# Patient Record
Sex: MALE | Race: WHITE | NOT HISPANIC OR LATINO | Employment: STUDENT
[De-identification: names, ages, dates, MRNs, and addresses within clinical notes are randomized per-mention and may not be internally consistent; named-entity substitution may affect disease eponyms.]

---
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
## 2025-03-09 ENCOUNTER — HEALTH MAINTENANCE LETTER (OUTPATIENT)
Age: 20
End: 2025-03-09

## 2025-04-05 ENCOUNTER — HEALTH MAINTENANCE LETTER (OUTPATIENT)
Age: 20
End: 2025-04-05

## 2025-06-01 ENCOUNTER — APPOINTMENT (OUTPATIENT)
Dept: GENERAL RADIOLOGY | Facility: CLINIC | Age: 20
End: 2025-06-01
Attending: NURSE PRACTITIONER
Payer: COMMERCIAL

## 2025-06-01 ENCOUNTER — HOSPITAL ENCOUNTER (EMERGENCY)
Facility: CLINIC | Age: 20
Discharge: HOME | End: 2025-06-01
Attending: NURSE PRACTITIONER | Admitting: NURSE PRACTITIONER
Payer: COMMERCIAL

## 2025-06-01 VITALS
RESPIRATION RATE: 16 BRPM | HEART RATE: 113 BPM | OXYGEN SATURATION: 98 % | SYSTOLIC BLOOD PRESSURE: 147 MMHG | TEMPERATURE: 99.9 F | DIASTOLIC BLOOD PRESSURE: 84 MMHG

## 2025-06-01 DIAGNOSIS — J18.9 COMMUNITY ACQUIRED PNEUMONIA OF RIGHT LOWER LOBE OF LUNG: ICD-10-CM

## 2025-06-01 PROCEDURE — 71046 X-RAY EXAM CHEST 2 VIEWS: CPT

## 2025-06-01 PROCEDURE — G0463 HOSPITAL OUTPT CLINIC VISIT: HCPCS | Mod: 25 | Performed by: NURSE PRACTITIONER

## 2025-06-01 PROCEDURE — 99214 OFFICE O/P EST MOD 30 MIN: CPT | Performed by: NURSE PRACTITIONER

## 2025-06-01 RX ORDER — DOXYCYCLINE 100 MG/1
100 CAPSULE ORAL 2 TIMES DAILY
Qty: 14 CAPSULE | Refills: 0 | Status: SHIPPED | OUTPATIENT
Start: 2025-06-01 | End: 2025-06-08

## 2025-06-01 ASSESSMENT — COLUMBIA-SUICIDE SEVERITY RATING SCALE - C-SSRS
1. IN THE PAST MONTH, HAVE YOU WISHED YOU WERE DEAD OR WISHED YOU COULD GO TO SLEEP AND NOT WAKE UP?: NO
6. HAVE YOU EVER DONE ANYTHING, STARTED TO DO ANYTHING, OR PREPARED TO DO ANYTHING TO END YOUR LIFE?: NO
2. HAVE YOU ACTUALLY HAD ANY THOUGHTS OF KILLING YOURSELF IN THE PAST MONTH?: NO

## 2025-06-01 ASSESSMENT — ACTIVITIES OF DAILY LIVING (ADL)
ADLS_ACUITY_SCORE: 41
ADLS_ACUITY_SCORE: 41

## 2025-06-01 NOTE — DISCHARGE INSTRUCTIONS
Your chest x-ray is suspicious for pneumonia in your right lower lung.  I have ordered doxycycline for you twice daily for 7 days that should treat the pneumonia that you have effectively.  If you develop any new onset of worsening fevers, chills, nausea or vomiting while taking the antibiotics you should return for further evaluation and treatment.  Monitor your blood sugars closely as these may change if you are sick with a viral illness and then pneumonia.

## 2025-06-01 NOTE — ED TRIAGE NOTES
5 days of cough fever and other uri symptoms brother has pneumonia.  Sanjuanita Antonio MA

## 2025-06-01 NOTE — ED PROVIDER NOTES
"ED Provider Note  [facility]      History     Chief Complaint   Patient presents with    Cough     HPI  Marlo Verde is a 19 year old male who presents with symptoms of cough, nasal congestion, fevers ranging from 99.0-101.0 over the last 5 days.  Reports that he had exposure to other family members that were sick with viral illness symptoms and one of his brothers recently was diagnosed with pneumonia after viral illness began.  Patient denies any productive coughing at this time.  Patient states that his symptoms are worsening versus improving over the last 5 days, tolerating oral fluids no nausea vomiting diarrhea abdominal pain or skin rash.  Past medical history of type 1 diabetes, reports his blood sugars are well-controlled.            Allergies:  Allergies   Allergen Reactions    Amoxicillin Rash     Pinpoint rash with slight pale halo around many of there dots, over most of the body, nonpruritic occurring one hour after the last dose on day 8, No other symptoms. The rash was difficult to discern as definitely allergic but because of the immediate onset after the dose it was deemed best to consider this allergic.       Problem List:    Patient Active Problem List    Diagnosis Date Noted    Delayed immunizations 07/24/2020     Priority: Medium     Mom declines HPV         Diabetes type I (H) 03/20/2018     Priority: Medium    MRSA (methicillin resistant Staphylococcus aureus) infection 09/23/2016     Priority: Medium        Past Medical History:    Past Medical History:   Diagnosis Date    Closed fracture of proximal end of radius     Pain in joint of right wrist        Past Surgical History:    Past Surgical History:   Procedure Laterality Date    NO PAST SURGERIES         Social History:  Marital Status:  Single [1]  Social History     Tobacco Use    Smoking status: Never    Smokeless tobacco: Never        Medications:    BD ULTRA-FINE GERARDO PEN NEEDLE 32 gauge x 5/32\" Ndle  DEXCOM " G6 TRANSMITTER Tahira  generic lancets (ACCU-CHEK FASTCLIX LANCING DEV)  GLUCAGON INJ  insulin lispro (HUMALOG PEN SUBQ)  LANTUS SOLOSTAR U-100 INSULIN 100 unit/mL (3 mL) pen          Review of Systems  A medically appropriate review of systems was performed with pertinent positives and negatives noted in the HPI, and all other systems negative.    Physical Exam   Patient Vitals for the past 24 hrs:   BP Temp Temp src Pulse Resp SpO2   06/01/25 1058 (!) 147/84 99.9  F (37.7  C) Tympanic 113 16 98 %          Physical Exam  General: alert and in no acute distress on arrival  Head: atraumatic, normocephalic,Eyes:  non-traumatic.  Left Eyes: Non-reddened conjunctiva, no icterus, noninjected, normal pupillary response to light. Normal extraocular movement.  Right eye: Non-reddened conjunctiva, no icterus, noninjected, normal pupillary response to light. Normal extraocular movement bilateral. No drainage present.ENT: Left Ear: TM intact, middle ear is not erythremic, no purulence, canal patent. Right Ear: TM intact, middle ear is not erythremic, no purulence, canal patent. Nose: No erythema or edema patent nostrils bilateral. Throat: midline uvula, non-erythremic, non-enlarged tonsils, without exudate.  No cervical adenopathy.  Lungs:  nonlabored, occasional scattered rhonchi throughout, no consolidations or diminished breath sounds in bilateral bases.  CV: Regular rate and rhythm, extremities warm and perfused  Abd: nondistended, nontender.  Skin: no rashes, no diaphoresis and skin color normal  Neuro: Patient awake, alert, speech is fluent, no focal deficits  Psychiatric: affect/mood normal, appropriate historian        ED Course   Chest x-ray ordered personally viewed images radiology interpretation reviewed, community-acquired pneumonia of right lower lobe seen on x-rays.                Procedures                    Results for orders placed or performed during the hospital encounter of 06/01/25 (from the past 48 hours)    Chest XR,  PA & LAT    Narrative    EXAM: XR CHEST 2 VIEWS  LOCATION: [facility]  DATE: 06/01/2025    INDICATION: Cough. Chills.  COMPARISON: None.      Impression    IMPRESSION: Ill-defined opacity in the right lower lung is suspicious for pneumonia. The left lung is clear. No pleural effusions. Heart size and pulmonary vascularity are within normal limits.         MEDICATIONS GIVEN IN THE EMERGENCY DEPARTMENT:  Medications - No data to display             Assessments & Plan (with Medical Decision Making)  19 year old male who presents to the Urgent Care for evaluation of upper respiratory viral illness symptoms for 4 days with ongoing fevers lasting over the last 4 days ranging from 99.0-101.4.  Patient has past medical history of type I diabetes, reports that blood sugars have been well-controlled.  Has family member that tested positive for upper respiratory viral illnesses and had pneumonia that has recently been treated.  Patient reports that he is concerned he may have pneumonia in the chest x-ray was ordered personally viewed images radiology interpretation reviewed.  On exam there is scattered rhonchi throughout upper lobes there were no consolidations or diminished bases bilateral.  Right lower lobe pneumonia seen on x-rays.  Treatment plan order doxycycline twice daily for 7 days.  Advised to return if he develops any new onset of worsening fevers, chills, nausea despite taking oral antibiotics.  Patient discharged home in stable condition.      Patient verbalized agreement with and understanding of the rational for the diagnosis and treatment plan.  All questions were answered to best of my ability and the patient's satisfaction. The patient was advised to contact or return to their primary clinic or UC/ED with any questions that may arise after this visit.       I have reviewed the nursing notes.    I have reviewed the findings, diagnosis, plan and need for follow up with  the patient.        NEW PRESCRIPTIONS STARTED AT TODAY'S ER VISIT  New Prescriptions    DOXYCYCLINE HYCLATE (VIBRAMYCIN) 100 MG CAPSULE    Take 1 capsule (100 mg) by mouth 2 times daily for 7 days.       Final diagnoses:   Community acquired pneumonia of right lower lobe of lung       6/1/2025   [facility] EMERGENCY DEPT    Disclaimer: This note consists of symbols derived from keyboarding, dictation, and/or voice recognition software. As a result, there may be errors in the script that have gone undetected.  Please consider this when interpreting information found in the chart.      Maggie Dotson, APRN CNP  06/01/25 5512

## 2025-06-02 ENCOUNTER — HOSPITAL ENCOUNTER (EMERGENCY)
Facility: CLINIC | Age: 20
Discharge: HOME | End: 2025-06-02
Payer: COMMERCIAL

## 2025-06-02 VITALS
HEART RATE: 92 BPM | OXYGEN SATURATION: 96 % | DIASTOLIC BLOOD PRESSURE: 80 MMHG | TEMPERATURE: 99.1 F | RESPIRATION RATE: 18 BRPM | SYSTOLIC BLOOD PRESSURE: 139 MMHG

## 2025-06-02 DIAGNOSIS — K12.1 STOMATITIS: ICD-10-CM

## 2025-06-02 DIAGNOSIS — T78.40XA ALLERGIC REACTION TO DRUG, INITIAL ENCOUNTER: ICD-10-CM

## 2025-06-02 DIAGNOSIS — J18.9 COMMUNITY ACQUIRED PNEUMONIA OF RIGHT LOWER LOBE OF LUNG: ICD-10-CM

## 2025-06-02 PROCEDURE — 99214 OFFICE O/P EST MOD 30 MIN: CPT

## 2025-06-02 PROCEDURE — 250N000009 HC RX 250

## 2025-06-02 PROCEDURE — G0463 HOSPITAL OUTPT CLINIC VISIT: HCPCS

## 2025-06-02 RX ORDER — AZITHROMYCIN 250 MG/1
TABLET, FILM COATED ORAL
Qty: 6 TABLET | Refills: 0 | Status: SHIPPED | OUTPATIENT
Start: 2025-06-02 | End: 2025-06-07

## 2025-06-02 RX ORDER — DEXAMETHASONE SODIUM PHOSPHATE 4 MG/ML
10 VIAL (ML) INJECTION ONCE
Status: COMPLETED | OUTPATIENT
Start: 2025-06-02 | End: 2025-06-02

## 2025-06-02 RX ORDER — CEFDINIR 300 MG/1
300 CAPSULE ORAL 2 TIMES DAILY
Qty: 14 CAPSULE | Refills: 0 | Status: SHIPPED | OUTPATIENT
Start: 2025-06-02 | End: 2025-06-09

## 2025-06-02 RX ORDER — CETIRIZINE HYDROCHLORIDE 10 MG/1
10 TABLET ORAL DAILY
Qty: 30 TABLET | Refills: 0 | Status: SHIPPED | OUTPATIENT
Start: 2025-06-02

## 2025-06-02 RX ORDER — FAMOTIDINE 20 MG/1
20 TABLET, FILM COATED ORAL 2 TIMES DAILY
Qty: 14 TABLET | Refills: 0 | Status: SHIPPED | OUTPATIENT
Start: 2025-06-02 | End: 2025-06-09

## 2025-06-02 RX ADMIN — DEXAMETHASONE SODIUM PHOSPHATE 10 MG: 4 INJECTION, SOLUTION INTRAMUSCULAR; INTRAVENOUS at 13:34

## 2025-06-02 ASSESSMENT — COLUMBIA-SUICIDE SEVERITY RATING SCALE - C-SSRS
6. HAVE YOU EVER DONE ANYTHING, STARTED TO DO ANYTHING, OR PREPARED TO DO ANYTHING TO END YOUR LIFE?: NO
2. HAVE YOU ACTUALLY HAD ANY THOUGHTS OF KILLING YOURSELF IN THE PAST MONTH?: NO
1. IN THE PAST MONTH, HAVE YOU WISHED YOU WERE DEAD OR WISHED YOU COULD GO TO SLEEP AND NOT WAKE UP?: NO

## 2025-06-02 ASSESSMENT — ACTIVITIES OF DAILY LIVING (ADL): ADLS_ACUITY_SCORE: 41

## 2025-06-02 NOTE — ED TRIAGE NOTES
"Pt started Doxycyline for pneumonia and states \" getting worse\" sores on lips , loss of sleep, blood sugar 250 at noon         "

## 2025-06-02 NOTE — ED PROVIDER NOTES
History     Chief Complaint   Patient presents with    Cough     HPI  Marlo Verde is a 19 year old male with PMH significant for type I DM who presents accompanied by his father for evaluation of allergic reaction concern.  Patient was diagnosed with CAP yesterday after having cough for 5 days, right lower lobe infiltrate seen on a chest x-ray.  Has amoxicillin allergy so was treated with doxycycline.  He took his first dose yesterday morning and a second dose at nighttime.  After he started this medication he started to notice oral lesions along his mouth and sudden pain with swallowing.  This was not present yesterday or prior to starting medication.  His father looked in his mouth and saw the oral lesions, became concerned that this may be an allergy to the doxycycline so he advised him to stop taking this, and then patient took 2 doses of Benadryl.  Upon waking up he also started to notice some mild facial swelling.  They then presented here for evaluation.  Patient did not have swelling of the lips or tongue, inability to swallow, difficulty with breathing or wheezing, chest pain or tightness, vomiting or diarrhea.    Patient has Dexcom at home however has not been wearing for the past couple of days.  Glucose was 250 just prior to arrival.  Patient and father deny vomiting, headache, blurred vision, confusion or changes to mental status, weakness, shortness of breath.    Allergies:  Allergies   Allergen Reactions    Amoxicillin Rash     Pinpoint rash with slight pale halo around many of there dots, over most of the body, nonpruritic occurring one hour after the last dose on day 8, No other symptoms. The rash was difficult to discern as definitely allergic but because of the immediate onset after the dose it was deemed best to consider this allergic.       Problem List:    Patient Active Problem List    Diagnosis Date Noted    Delayed immunizations 07/24/2020     Priority: Medium     Mom declines HPV          "Diabetes type I (H) 03/20/2018     Priority: Medium    MRSA (methicillin resistant Staphylococcus aureus) infection 09/23/2016     Priority: Medium        Past Medical History:    Past Medical History:   Diagnosis Date    Closed fracture of proximal end of radius     Pain in joint of right wrist        Past Surgical History:    Past Surgical History:   Procedure Laterality Date    NO PAST SURGERIES         Family History:    Family History   Problem Relation Age of Onset    Diabetes Maternal Grandmother     Diabetes Paternal Grandfather     Other - See Comments Paternal Grandfather         Nephrectomy for a benign tumor.    Sleep Apnea Paternal Grandfather     Diabetes Other     Other - See Comments Paternal Grandmother         Pre-diabetes    Other - See Comments Maternal Aunt         Lymes    Hypertension Maternal Grandfather     Prostate Cancer Maternal Grandfather     Sleep Apnea Paternal Uncle     No Known Problems Mother     No Known Problems Father        Social History:  Marital Status:  Single [1]  Social History     Tobacco Use    Smoking status: Never    Smokeless tobacco: Never        Medications:    azithromycin (ZITHROMAX) 250 MG tablet  cefdinir (OMNICEF) 300 MG capsule  cetirizine (ZYRTEC) 10 MG tablet  famotidine (PEPCID) 20 MG tablet  BD ULTRA-FINE GERARDO PEN NEEDLE 32 gauge x 5/32\" Ndle  DEXCOM G6 TRANSMITTER Tahira  doxycycline hyclate (VIBRAMYCIN) 100 MG capsule  generic lancets (ACCU-CHEK FASTCLIX LANCING DEV)  GLUCAGON INJ  insulin lispro (HUMALOG PEN SUBQ)  LANTUS SOLOSTAR U-100 INSULIN 100 unit/mL (3 mL) pen          Review of Systems  Pertinent review of systems as documented per HPI above.    Physical Exam   BP: 139/80  Pulse: (!) 121  Temp: 99.1  F (37.3  C)  Resp: 18  SpO2: 96 %      Physical Exam  Vitals and nursing note reviewed.   Constitutional:       General: He is not in acute distress.     Appearance: Normal appearance. He is not ill-appearing, toxic-appearing or diaphoretic.   HENT: "      Head: Atraumatic.      Right Ear: Tympanic membrane, ear canal and external ear normal.      Left Ear: Tympanic membrane, ear canal and external ear normal.      Nose: Congestion present.      Mouth/Throat:      Mouth: Oral lesions present. No angioedema.      Tongue: No lesions.      Palate: No mass.      Pharynx: Uvula midline. Pharyngeal swelling and posterior oropharyngeal erythema present.      Tonsils: No tonsillar exudate or tonsillar abscesses.      Comments: Voice is not muffled.  Tolerates secretions.  Eyes:      General: No scleral icterus.     Extraocular Movements: Extraocular movements intact.      Conjunctiva/sclera: Conjunctivae normal.   Cardiovascular:      Rate and Rhythm: Normal rate.   Pulmonary:      Effort: Pulmonary effort is normal.      Breath sounds: Rhonchi (right lower lobe) present.   Abdominal:      General: Abdomen is flat. Bowel sounds are normal. There is no distension.      Palpations: Abdomen is soft.      Tenderness: There is no abdominal tenderness. There is no guarding.   Musculoskeletal:      Cervical back: Normal range of motion and neck supple.   Skin:     General: Skin is warm and dry.      Findings: No rash.   Neurological:      General: No focal deficit present.      Mental Status: He is alert and oriented to person, place, and time.   Psychiatric:         Mood and Affect: Mood normal.         Behavior: Behavior normal.         Thought Content: Thought content normal.         Judgment: Judgment normal.         ED Course       Medications   dexAMETHasone (DECADRON) injectable solution used ORALLY 10 mg (10 mg Oral $Given 6/2/25 7975)       Assessments & Plan (with Medical Decision Making)     I have reviewed the nursing notes.    I have reviewed the findings, diagnosis, plan and need for follow up with the patient.  19-year-old male with history of type I DM who presents for evaluation of a concern for allergic reaction.  Diagnosed with pneumonia yesterday  demonstrated on chest x-ray, started doxycycline for treatment.  Took a total of 2 doses when he started to notice oral lesions inside of his mouth and pain with swallowing.  They are concerned that he may be allergic to the medication.  In addition he is supposed to be wearing Dexcom however has not been wearing for the past few days, checked blood sugar which was 250 prior to arrival.  Denies vomiting, abdominal pain, headache, blurry vision, confusion, changes to mental status, weakness or shortness of breath.    Patient well-appearing on arrival, initially tachycardic at 121 bpm however normalized to 92 on my examination.  Other vital signs are stable.  On examination of oropharynx there are oral lesions present along with swelling of pharynx and posterior oropharyngeal erythema.  No angioedema.  He is able to tolerate secretions and voice is not muffled.  On auscultation there is scattered rhonchi to the right lower lobe consistent with his pneumonia.  No signs of respiratory distress.  Remainder of exam is reassuring as above.  It does appear that he has stomatitis, unsure if this is related to the doxycycline however with patient and father's concern I did discontinue this medication and prescribed cefdinir (has tolerated Keflex in past) along with azithromycin for atypical coverage.  He was given Decadron here to aid with pain and swelling.  He was then provided with cetirizine and famotidine to help decrease allergic reaction response.  I strongly recommended that he wear Dexcom and regularly check blood sugars especially while sick and adjust insulin accordingly.  Advised that if he continues to have higher readings despite adjusting insulin or he develops symptoms of hyperglycemia, or signs of worsening infection like shortness of breath persistent fevers or other symptoms concerning to them, he needs to be seen promptly for reevaluation.  All questions were answered.  Patient and father verbalized  understanding and agreement with the above plan.    Disclaimer: This note consists of symbols derived from keyboarding, dictation, and/or voice recognition software. As a result, there may be errors in the script that have gone undetected.  Please consider this when interpreting information found in the chart.      Discharge Medication List as of 6/2/2025  1:31 PM        START taking these medications    Details   azithromycin (ZITHROMAX) 250 MG tablet Take 2 tablets (500 mg) by mouth daily for 1 day, THEN 1 tablet (250 mg) daily for 4 days., Disp-6 tablet, R-0, E-Prescribe      cefdinir (OMNICEF) 300 MG capsule Take 1 capsule (300 mg) by mouth 2 times daily for 7 days., Disp-14 capsule, R-0, E-Prescribe      cetirizine (ZYRTEC) 10 MG tablet Take 1 tablet (10 mg) by mouth daily., Disp-30 tablet, R-0, E-Prescribe      famotidine (PEPCID) 20 MG tablet Take 1 tablet (20 mg) by mouth 2 times daily for 7 days., Disp-14 tablet, R-0, E-Prescribe             Final diagnoses:   Community acquired pneumonia of right lower lobe of lung   Allergic reaction to drug, initial encounter   Stomatitis       6/2/2025   [facility] EMERGENCY DEPT       Steph Merino PA-C  06/02/25 [id number]

## 2025-06-02 NOTE — DISCHARGE INSTRUCTIONS
- Discontinue doxycycline.  Start cefdinir and azithromycin to treat pneumonia.  -Start Zyrtec and Pepcid for treatment of allergic reaction.  Take these both 2 times a day for the next week.  -Steroid medicine was given here today.  This should start to help the allergic reaction.  This does increase blood sugars temporarily. Continue to monitor blood sugars with Dexcom and adjust insulin as necessary.  -Keep a close eye on symptoms and please return if difficulty with breathing or shortness of breath, uncontrolled blood sugars, vomiting, or any other symptoms that are concerning to you.

## 2025-06-03 ENCOUNTER — HOSPITAL ENCOUNTER (EMERGENCY)
Facility: HOSPITAL | Age: 20
End: 2025-06-03
Payer: COMMERCIAL

## 2025-06-03 NOTE — ED NOTES
Expected Patient Referral to ED  10:28 AM    Referring Clinic/Provider:  [facility]    Reason for referral/Clinical facts:  History of DM.  Had upper repiratory symptoms last week.  Started on cefdinir and zpack.  Now has a bad mucositis with enflamed mouith with sores.  Tachycardic with elevated blood sugars.  Can't swallow, is vomiting/.  Creatinine is normal.  Has gotten normal saline and dexamethasone.  Not sure its SJ but mucositis is severe and he can't tolerate PO.  Needs admission.    Recommendations provided:  Send to ED for further evaluation  Let provider know we are transferring all patients so consider direct admission somehwere.  [facility] are [facility] that we are aware of.    Caller was informed that this institution does possess the capabilities and/or resources to provide for patient and should be transferred to our facility.    Discussed that if direct admit is sought and any hurdles are encountered, this ED would be happy to see the patient and evaluate.    Informed caller that recommendations provided are recommendations based only on the facts provided and that they responsible to accept or reject the advice, or to seek a formal in person consultation as needed and that this ED will see/treat patient should they arrive.      Jericho Kay MD  [facility] EMERGENCY DEPARTMENT  [facility] [facility] [id number] [phone]       Jericho Kay MD  06/03/25 [id number]